# Patient Record
(demographics unavailable — no encounter records)

---

## 2025-02-03 NOTE — DISCUSSION/SUMMARY
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  The patient was extensively counseled on treatment options including but not limited to observation, rest/activity modification, bracing, anti-inflammatory medications, physical therapy, injections, and surgery.  The natural history of the disease was thoroughly explained.  We discussed that the majority of the time, this condition can be initially treated conservatively. The patient will proceed with: -lovenox, will up titrate if considering flight to [place] in 3w - discussed increased DVT risk with long flight per[place] but would like to visit family who is ill -PT -pt was instructed on the importance of resting, icing and elevating to minimize swelling -RTC 6w, new hip XR   I have personally obtained the history, reviewed the ROS as noted, and performed the physical examination today.  The patient and I discussed the assessment and options and developed the plan.  All questions were answered and the patient stated their understanding of the treatment plan and appreciation of the visit.   My cumulative time spent on this patient's visit included: Preparation for the visit, review of the medical records, review of pertinent diagnostic studies, examination and counseling of the patient on the above diagnosis, treatment plan and prognosis, orders of diagnostic tests, medications and/or appropriate procedures and documentation in the medical records of today's visit.   Darryl Roth MD

## 2025-02-03 NOTE — PHYSICAL EXAM
[de-identified] : NAD nonlabored resp Gait: steady w walker RLE Neg log roll/heel strike dressing removed and incision healing well, no erythema/fluctuance/purulence/streaking - steristrips placed  5/5 IP Q 1/5 EHL TA GS (baseline) SILT L3-S1 Painless knee/ankle/foot rom 2+ dp wwp bcr  [de-identified] : The following radiographs were ordered and read by me during this patient's visit. I reviewed each radiograph in detail with the patient and discussed the findings as highlighted below.   2 views of the L hip were obtained today hip frx in appropriate alignement of short IMN. There are no degenerative changes seen. There is no malalignment. No obvious osseous abnormality. Otherwise unremarkable.

## 2025-04-17 NOTE — PHYSICAL EXAM
[de-identified] : NAD nonlabored resp Gait: steady w cane RLE baseline lld  Neg log roll/heel strike incision healed 15 ER, 10 IR 5/5 IP Q 1/5 EHL TA GS (baseline), 5/5 abd/add TTP ITB SILT L3-S1 Painless knee/ankle/foot rom 2+ dp wwp bcr  [de-identified] : The following radiographs were ordered and read by me during this patient's visit. I reviewed each radiograph in detail with the patient and discussed the findings as highlighted below.   2 views of the L hip were obtained today hip frx in appropriate alignement of short IMN - medial frx line healing. There are no degenerative changes seen. There is no malalignment. No obvious osseous abnormality. Otherwise unremarkable.

## 2025-04-17 NOTE — DISCUSSION/SUMMARY
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  The patient was extensively counseled on treatment options including but not limited to observation, rest/activity modification, bracing, anti-inflammatory medications, physical therapy, injections, and surgery.  The natural history of the disease was thoroughly explained.  We discussed that the majority of the time, this condition can be initially treated conservatively. Pt has returned to ambulating 2mi/d and is operating, ~2/10 pain mostly lateral from ITB irritation from nail placement & lag screw, which we discussed should decrease with time but option to exchange once fully healed in future, especially if frx compresses further. Will closely monitor progress, discussed natural hx of hip frx healing and noted that walking 2mi/d w and performing surgery within 3mo of OR is excellent progress.  The patient will proceed with: -HEP -pt was instructed on the importance of resting, icing and elevating to minimize swelling -RTC 2-3m, new hip XR   I have personally obtained the history, reviewed the ROS as noted, and performed the physical examination today.  The patient and I discussed the assessment and options and developed the plan.  All questions were answered and the patient stated their understanding of the treatment plan and appreciation of the visit.   My cumulative time spent on this patient's visit included: Preparation for the visit, review of the medical records, review of pertinent diagnostic studies, examination and counseling of the patient on the above diagnosis, treatment plan and prognosis, orders of diagnostic tests, medications and/or appropriate procedures and documentation in the medical records of today's visit.   Darryl Roth MD

## 2025-04-17 NOTE — HISTORY OF PRESENT ILLNESS
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 Pt reports current pain level 3 out 10, symptoms worse with bending. Pain is intermittent and dull, localized to the left hip. He is currently in physical therapy and trials NSAID, Tylenol and ice bags as needed that has helping with pain relief.  Using RW currently, on lovenox - no unilateral calf swelling/cp/sob, wife who is also physician closely monitoring symptoms. Considering [place] trip in 3-4w to visit father who is ill and attend familial wedding.  4/17 - notes he is ambulating w cane, walking ~2mi/d, has returned to operating multiple days per week without issue. Concerned about persistent 2-3/10 hip pain radiating down lateral thigh, also notes some stiffness and difficulty wearing socks

## 2025-06-25 NOTE — HISTORY OF PRESENT ILLNESS
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 Pt reports current pain level 3 out 10, symptoms worse with bending. Pain is intermittent and dull, localized to the left hip. He is currently in physical therapy and trials NSAID, Tylenol and ice bags as needed that has helping with pain relief.  Using RW currently, on lovenox - no unilateral calf swelling/cp/sob, wife who is also physician closely monitoring symptoms. Considering [place] trip in 3-4w to visit father who is ill and attend familiar wedding. 98.4